# Patient Record
Sex: MALE | Race: WHITE | URBAN - METROPOLITAN AREA
[De-identification: names, ages, dates, MRNs, and addresses within clinical notes are randomized per-mention and may not be internally consistent; named-entity substitution may affect disease eponyms.]

---

## 2022-12-14 ENCOUNTER — APPOINTMENT (RX ONLY)
Dept: URBAN - METROPOLITAN AREA CLINIC 116 | Facility: CLINIC | Age: 67
Setting detail: DERMATOLOGY
End: 2022-12-14

## 2022-12-14 DIAGNOSIS — L57.0 ACTINIC KERATOSIS: ICD-10-CM

## 2022-12-14 DIAGNOSIS — L70.8 OTHER ACNE: ICD-10-CM

## 2022-12-14 DIAGNOSIS — L21.8 OTHER SEBORRHEIC DERMATITIS: ICD-10-CM

## 2022-12-14 PROCEDURE — 99203 OFFICE O/P NEW LOW 30 MIN: CPT | Mod: 25

## 2022-12-14 PROCEDURE — ? LIQUID NITROGEN

## 2022-12-14 PROCEDURE — 17000 DESTRUCT PREMALG LESION: CPT

## 2022-12-14 PROCEDURE — ? PRESCRIPTION

## 2022-12-14 RX ORDER — KETOCONAZOLE 20 MG/G
CREAM TOPICAL BID
Qty: 30 | Refills: 2 | COMMUNITY
Start: 2022-12-14

## 2022-12-14 RX ADMIN — KETOCONAZOLE: 20 CREAM TOPICAL at 00:00

## 2022-12-14 ASSESSMENT — LOCATION ZONE DERM
LOCATION ZONE: EAR
LOCATION ZONE: SCALP
LOCATION ZONE: TRUNK
LOCATION ZONE: FACE

## 2022-12-14 ASSESSMENT — LOCATION DETAILED DESCRIPTION DERM
LOCATION DETAILED: INFERIOR LUMBAR SPINE
LOCATION DETAILED: RIGHT SUPERIOR PREAURICULAR CHEEK
LOCATION DETAILED: LEFT TRAGUS
LOCATION DETAILED: MID-FRONTAL SCALP

## 2022-12-14 ASSESSMENT — LOCATION SIMPLE DESCRIPTION DERM
LOCATION SIMPLE: ANTERIOR SCALP
LOCATION SIMPLE: RIGHT CHEEK
LOCATION SIMPLE: LEFT EAR
LOCATION SIMPLE: LOWER BACK

## 2022-12-14 NOTE — PROCEDURE: LIQUID NITROGEN
Show Applicator Variable?: Yes
Detail Level: Simple
Render Post-Care Instructions In Note?: no
Number Of Freeze-Thaw Cycles: 2 freeze-thaw cycles
Consent: The patient's consent was obtained including but not limited to risks of crusting, scabbing, blistering, scarring, darker or lighter pigmentary change, recurrence, incomplete removal and infection.
Post-Care Instructions: I reviewed with the patient in detail post-care instructions. Patient is to wear sunprotection, and avoid picking at any of the treated lesions. Pt may apply Vaseline to crusted or scabbing areas.
Duration Of Freeze Thaw-Cycle (Seconds): 10

## 2023-01-18 RX ORDER — KETOCONAZOLE 20 MG/G
CREAM TOPICAL BID
Qty: 30 | Refills: 2 | Status: ERX

## 2024-06-28 ENCOUNTER — APPOINTMENT (RX ONLY)
Dept: URBAN - METROPOLITAN AREA CLINIC 114 | Facility: CLINIC | Age: 69
Setting detail: DERMATOLOGY
End: 2024-06-28

## 2024-06-28 DIAGNOSIS — L57.0 ACTINIC KERATOSIS: ICD-10-CM

## 2024-06-28 DIAGNOSIS — I83.9 ASYMPTOMATIC VARICOSE VEINS OF LOWER EXTREMITIES: ICD-10-CM

## 2024-06-28 DIAGNOSIS — L82.1 OTHER SEBORRHEIC KERATOSIS: ICD-10-CM

## 2024-06-28 DIAGNOSIS — L82.0 INFLAMED SEBORRHEIC KERATOSIS: ICD-10-CM

## 2024-06-28 DIAGNOSIS — L90.5 SCAR CONDITIONS AND FIBROSIS OF SKIN: ICD-10-CM

## 2024-06-28 DIAGNOSIS — D49.2 NEOPLASM OF UNSPECIFIED BEHAVIOR OF BONE, SOFT TISSUE, AND SKIN: ICD-10-CM

## 2024-06-28 DIAGNOSIS — D18.0 HEMANGIOMA: ICD-10-CM

## 2024-06-28 PROBLEM — D18.01 HEMANGIOMA OF SKIN AND SUBCUTANEOUS TISSUE: Status: ACTIVE | Noted: 2024-06-28

## 2024-06-28 PROBLEM — I83.93 ASYMPTOMATIC VARICOSE VEINS OF BILATERAL LOWER EXTREMITIES: Status: ACTIVE | Noted: 2024-06-28

## 2024-06-28 PROCEDURE — ? COUNSELING

## 2024-06-28 PROCEDURE — 99213 OFFICE O/P EST LOW 20 MIN: CPT | Mod: 25

## 2024-06-28 PROCEDURE — ? LIQUID NITROGEN

## 2024-06-28 PROCEDURE — 11102 TANGNTL BX SKIN SINGLE LES: CPT | Mod: 59

## 2024-06-28 PROCEDURE — ? BIOPSY BY SHAVE METHOD

## 2024-06-28 PROCEDURE — 17000 DESTRUCT PREMALG LESION: CPT | Mod: 59

## 2024-06-28 PROCEDURE — ? DEFER

## 2024-06-28 PROCEDURE — 17110 DESTRUCTION B9 LES UP TO 14: CPT

## 2024-06-28 PROCEDURE — 17003 DESTRUCT PREMALG LES 2-14: CPT | Mod: 59

## 2024-06-28 ASSESSMENT — LOCATION SIMPLE DESCRIPTION DERM
LOCATION SIMPLE: POSTERIOR NECK
LOCATION SIMPLE: LEFT SCALP
LOCATION SIMPLE: RIGHT PRETIBIAL REGION
LOCATION SIMPLE: LEFT INDEX FINGER
LOCATION SIMPLE: LEFT PRETIBIAL REGION
LOCATION SIMPLE: RIGHT UPPER BACK
LOCATION SIMPLE: LEFT TEMPLE

## 2024-06-28 ASSESSMENT — LOCATION DETAILED DESCRIPTION DERM
LOCATION DETAILED: LEFT MEDIAL FRONTAL SCALP
LOCATION DETAILED: MID POSTERIOR NECK
LOCATION DETAILED: LEFT PROXIMAL PRETIBIAL REGION
LOCATION DETAILED: RIGHT INFERIOR UPPER BACK
LOCATION DETAILED: RIGHT DISTAL PRETIBIAL REGION
LOCATION DETAILED: LEFT LATERAL TEMPLE
LOCATION DETAILED: LEFT DISTAL PRETIBIAL REGION
LOCATION DETAILED: RIGHT SUPERIOR MEDIAL UPPER BACK
LOCATION DETAILED: LEFT MID DORSAL INDEX FINGER

## 2024-06-28 ASSESSMENT — LOCATION ZONE DERM
LOCATION ZONE: LEG
LOCATION ZONE: TRUNK
LOCATION ZONE: NECK
LOCATION ZONE: FINGER
LOCATION ZONE: SCALP
LOCATION ZONE: FACE

## 2024-06-28 NOTE — PROCEDURE: DEFER
Introduction Text (Please End With A Colon): .
Instructions (Optional): Dr Hollingsworth Consult
X Size Of Lesion In Cm (Optional): 0
Detail Level: Detailed

## 2024-06-28 NOTE — PROCEDURE: BIOPSY BY SHAVE METHOD
Detail Level: Detailed
Depth Of Biopsy: dermis
Was A Bandage Applied: Yes
Size Of Lesion In Cm: 0.5
X Size Of Lesion In Cm: 0
Biopsy Type: H and E
Biopsy Method: Personna blade
Anesthesia Type: 1% lidocaine with epinephrine
Anesthesia Volume In Cc: 1
Hemostasis: Pressure
Wound Care: No ointment
Dressing: no dressing applied
Destruction After The Procedure: No
Type Of Destruction Used: Curettage
Curettage Text: The wound bed was treated with curettage after the biopsy was performed.
Cryotherapy Text: The wound bed was treated with cryotherapy after the biopsy was performed.
Electrodesiccation Text: The wound bed was treated with electrodesiccation after the biopsy was performed.
Electrodesiccation And Curettage Text: The wound bed was treated with electrodesiccation and curettage after the biopsy was performed.
Silver Nitrate Text: The wound bed was treated with silver nitrate after the biopsy was performed.
Lab: [id number]
Lab Facility: [id number]
Consent: Written consent was obtained and risks were reviewed including but not limited to scarring, infection, bleeding, scabbing, incomplete removal, nerve damage and allergy to anesthesia.
Post-Care Instructions: I reviewed with the patient in detail post-care instructions. Patient is to keep the biopsy site dry overnight, and then apply bacitracin twice daily until healed. Patient may apply hydrogen peroxide soaks to remove any crusting.
Notification Instructions: Patient will be notified of biopsy results. However, patient instructed to call the office if not contacted within 2 weeks.
Billing Type: Third-Party Bill
Information: Selecting Yes will display possible errors in your note based on the variables you have selected. This validation is only offered as a suggestion for you. PLEASE NOTE THAT THE VALIDATION TEXT WILL BE REMOVED WHEN YOU FINALIZE YOUR NOTE. IF YOU WANT TO FAX A PRELIMINARY NOTE YOU WILL NEED TO TOGGLE THIS TO 'NO' IF YOU DO NOT WANT IT IN YOUR FAXED NOTE.

## 2024-06-28 NOTE — PROCEDURE: LIQUID NITROGEN
Spray Paint Technique: No
Show Aperture Variable?: Yes
Number Of Freeze-Thaw Cycles: 3 freeze-thaw cycles
Topical Anesthesia Type: liquid nitrogen
Medical Necessity Clause: patient wears glasses and this lesion gets in the way of his glasses.
Spray Paint Text: The liquid nitrogen was applied to the skin utilizing a spray paint frosting technique.
Post-Care Instructions: I reviewed with the patient in detail post-care instructions. Patient is to wear sunprotection, and avoid picking at any of the treated lesions. Pt may apply Vaseline to crusted or scabbing areas.
Detail Level: Simple
Consent: The patient's consent was obtained including but not limited to risks of crusting, scabbing, blistering, scarring, darker or lighter pigmentary change, recurrence, incomplete removal and infection.
Duration Of Freeze Thaw-Cycle (Seconds): 5-10
Medical Necessity Information: It is in your best interest to select a reason for this procedure from the list below. All of these items fulfill various CMS LCD requirements except the new and changing color options.
Detail Level: Detailed
Duration Of Freeze Thaw-Cycle (Seconds): 3
Number Of Freeze-Thaw Cycles: 1 freeze-thaw cycle

## 2024-08-28 ENCOUNTER — APPOINTMENT (RX ONLY)
Dept: URBAN - METROPOLITAN AREA CLINIC 116 | Facility: CLINIC | Age: 69
Setting detail: DERMATOLOGY
End: 2024-08-28

## 2024-08-28 DIAGNOSIS — I87.2 VENOUS INSUFFICIENCY (CHRONIC) (PERIPHERAL): ICD-10-CM | Status: WORSENING

## 2024-08-28 PROCEDURE — ? CEAP CLASSIFICATION

## 2024-08-28 PROCEDURE — ? ADDITIONAL NOTES

## 2024-08-28 PROCEDURE — ? MEDICAL CONSULTATION: VENOUS DISEASE

## 2024-08-28 PROCEDURE — ? PHOTO-DOCUMENTATION

## 2024-08-28 PROCEDURE — ? VENOUS CLINICAL SEVERITY SCORE

## 2024-08-28 PROCEDURE — 99204 OFFICE O/P NEW MOD 45 MIN: CPT

## 2024-08-28 PROCEDURE — ? RECOMMENDATIONS

## 2024-08-28 ASSESSMENT — LOCATION DETAILED DESCRIPTION DERM
LOCATION DETAILED: LEFT PROXIMAL PRETIBIAL REGION
LOCATION DETAILED: RIGHT DISTAL PRETIBIAL REGION

## 2024-08-28 ASSESSMENT — LOCATION SIMPLE DESCRIPTION DERM
LOCATION SIMPLE: RIGHT PRETIBIAL REGION
LOCATION SIMPLE: LEFT PRETIBIAL REGION

## 2024-08-28 ASSESSMENT — LOCATION ZONE DERM: LOCATION ZONE: LEG

## 2024-08-28 NOTE — HPI: VEIN EVALUATION
Which Leg Is Worse?: Equally Affected
Is This A New Presentation, Or A Follow-Up?: Vein Evaluation
Additional History: He states his feet feel like he’s walking on blocks of ice. \\Ehsan has numbness from the knee down in BL legs. He’s had the numbness for 5-10 years. He states his pcp said he has great blood flow to his feet. He had his pulses checked by his PCP. \\nNo hx of migraines \\nNo hx of asthma \\nNo hx of a PFO. \\nAllergies: Latex \\nNot currently taking iron or doxycycline \\Ehsan does not wear compression stockings. \\nNo previous vein treatment. \\Ehsan has cramping at night 7 night a week about every hour. It does disrupt his sleep. A lot of the times he has to jump out of bed because of the cramping. He has had cramping 2-5 years. \\Ehsan’s had major neck surgery.\\Ehsan is having sinus surgery on 08/29/24

## 2024-08-28 NOTE — PROCEDURE: MEDICAL CONSULTATION: VENOUS DISEASE
Left Leg Venous Edema: 2- Afternoon edema above ankle
Include Left Vcss In Note: Yes
Include Vcss In The Note?: No
Left Leg Induration: 0- None
Left Leg Varicose Veins: 1- Few, scattered: branch varicose veins
Right Leg Pain: 2- Daily, moderate activity limitation, occasional analgesics
Pathophysiology Set 1: Pr - Reflux
Left Leg Compression Therapy: 0- None or noncompliant
Etiology Set 1: Ec - Congenital
Right Dorsalis Pedis Pulse: 2 (Easily palpable)
Clinical Classification Set 2: C0 - no visible or palpable signs of venous disease
Left Leg Circumference: medium
Anatomy Set 1: As - Superficial Veins
Right Leg Venous Hyperpigmentation: 0- None or focal low intensity (tan)
Detail Level: Zone
Right Leg: Peripheral Vascular Disease?: not assessed
Limitations: Patient reports continuing daily activities and daily functions with symptoms of cramping, numbness and is reported to be worse by days end and after activities.

## 2024-08-28 NOTE — PROCEDURE: VENOUS CLINICAL SEVERITY SCORE
Right Leg Active Ulcers: 0- None
Right Leg Compression Therapy: 0- None or noncompliant
Left Leg Varicose Veins: 1- Few, scattered: branch varicose veins
Include Left Vcss In Note: Yes
Left Leg Pigmentation: 0- None or focal low intensity (tan)
Left Leg Venous Edema: 2- Afternoon edema above ankle
Detail Level: Simple
Left Leg Pain: 2- Daily, moderate activity limitation, occasional analgesics

## 2024-08-28 NOTE — PROCEDURE: ADDITIONAL NOTES
Detail Level: Simple
Render Risk Assessment In Note?: no
Additional Notes: Fabiano would like to hold on purchasing compression until u/s review.

## 2024-08-28 NOTE — PROCEDURE: RECOMMENDATIONS
Render Risk Assessment In Note?: no
Recommendation Preamble: The following recommendations were made during the visit:\\nPatient to be scheduled for bilateral lower extremity venous ultrasound.\\nPatient to follow up with Dr. Hollingsworth after ultrasound is completed to review US results.
Detail Level: Zone
Patient Management Risk Assessment: Moderate

## 2024-10-23 ENCOUNTER — APPOINTMENT (RX ONLY)
Dept: URBAN - METROPOLITAN AREA CLINIC 114 | Facility: CLINIC | Age: 69
Setting detail: DERMATOLOGY
End: 2024-10-23

## 2024-10-23 DIAGNOSIS — L29.89 OTHER PRURITUS: ICD-10-CM

## 2024-10-23 DIAGNOSIS — B07.8 OTHER VIRAL WARTS: ICD-10-CM

## 2024-10-23 DIAGNOSIS — L90.5 SCAR CONDITIONS AND FIBROSIS OF SKIN: ICD-10-CM

## 2024-10-23 PROCEDURE — ? INTRALESIONAL KENALOG

## 2024-10-23 PROCEDURE — ? LIQUID NITROGEN

## 2024-10-23 PROCEDURE — 99212 OFFICE O/P EST SF 10 MIN: CPT | Mod: 25

## 2024-10-23 PROCEDURE — ? COUNSELING

## 2024-10-23 PROCEDURE — 11900 INJECT SKIN LESIONS </W 7: CPT | Mod: 59

## 2024-10-23 PROCEDURE — 17110 DESTRUCTION B9 LES UP TO 14: CPT

## 2024-10-23 PROCEDURE — ? PATHOLOGY DISCUSSION

## 2024-10-23 ASSESSMENT — LOCATION SIMPLE DESCRIPTION DERM
LOCATION SIMPLE: LEFT INDEX FINGER
LOCATION SIMPLE: LEFT UPPER ARM

## 2024-10-23 ASSESSMENT — LOCATION DETAILED DESCRIPTION DERM
LOCATION DETAILED: LEFT MID DORSAL INDEX FINGER
LOCATION DETAILED: LEFT ANTERIOR PROXIMAL UPPER ARM

## 2024-10-23 ASSESSMENT — LOCATION ZONE DERM
LOCATION ZONE: ARM
LOCATION ZONE: FINGER

## 2024-10-23 NOTE — PROCEDURE: INTRALESIONAL KENALOG
How Many Mls Were Removed From The 10 Mg/Ml (5ml) Vial When Preparing The Injectable Solution?: 0
Treatment Number (Optional): 1
Administered By (Optional): Irene Bosch PA-C
Total Volume (Ccs): 0.2
Kenalog Type Of Vial: Multiple Dose
Validate Note Data When Using Inventory: Yes
Concentration Of Kenalog Solution Injected (Mg/Ml): 40.0
Show Inventory Tab: Hide
Medical Necessity Clause: This procedure was medically necessary because the lesions that were treated were:
Require Ndc Code?: No
Detail Level: Simple
Lot # For Kenalog (Optional): [id number]
Expiration Date For Kenalog (Optional): JAN 2026
Consent: The risks of atrophy were reviewed with the patient.
Ndc# For Kenalog Only: 3541-5238-13
Kenalog Preparation: Kenalog

## 2024-10-23 NOTE — PROCEDURE: LIQUID NITROGEN
Spray Paint Technique: No
Show Aperture Variable?: Yes
Medical Necessity Clause: patient uses a cpap at night and this lesion gets in the way of his mask
Number Of Freeze-Thaw Cycles: 3 freeze-thaw cycles
Medical Necessity Information: It is in your best interest to select a reason for this procedure from the list below. All of these items fulfill various CMS LCD requirements except the new and changing color options.
Topical Anesthesia Type: liquid nitrogen
Post-Care Instructions: I reviewed with the patient in detail post-care instructions. Patient is to wear sunprotection, and avoid picking at any of the treated lesions. Pt may apply Vaseline to crusted or scabbing areas.
Consent: The patient's consent was obtained including but not limited to risks of crusting, scabbing, blistering, scarring, darker or lighter pigmentary change, recurrence, incomplete removal and infection.
Spray Paint Text: The liquid nitrogen was applied to the skin utilizing a spray paint frosting technique.
Detail Level: Simple
Duration Of Freeze Thaw-Cycle (Seconds): 5-10

## 2024-10-29 ENCOUNTER — APPOINTMENT (RX ONLY)
Dept: URBAN - METROPOLITAN AREA CLINIC 116 | Facility: CLINIC | Age: 69
Setting detail: DERMATOLOGY
End: 2024-10-29

## 2024-10-29 DIAGNOSIS — I87.2 VENOUS INSUFFICIENCY (CHRONIC) (PERIPHERAL): ICD-10-CM

## 2024-10-29 PROCEDURE — ? LOWER EXTREMITY DOPPLER US

## 2024-10-29 PROCEDURE — 93970 EXTREMITY STUDY: CPT

## 2024-10-29 NOTE — PROCEDURE: LOWER EXTREMITY DOPPLER US
Add Milliseconds Of Reflux To Note?: Yes
Intraluminal Thrombus: No
Right Intraluminal Thrombus- No: The right deep veins were imaged from the level of the common femoral vein to the posterior tibial veins. All deep veins demonstrated compressibility without evidence of intraluminal thrombus.
Use - 'see Attached Documentation' Verbiage?: Yes - Will Include Text Below and Will Remove Other Portions of the Note
See Attached Documentation Text: Please refer to the attached ultrasound documentation for complete details of the procedure and the venous findings.
Right Intraluminal Thrombus- Yes: The right deep veins were imaged from the level of the common femoral vein to the posterior tibial veins. There was evidence of intraluminal thrombus as noted above.
Continue Conservative Therapy Text: Continue conservative treatment (such as compression stockings, OTC analgesics, and exercise) and consider intervention if no change or worsening symptoms to varicosities.
Reflux Options: Use Range
Left Intraluminal Thrombus- No: The left deep veins were imaged from the level of the common femoral vein to the posterior tibial veins. All deep veins demonstrated compressibility without evidence of intraluminal thrombus.
Detail Level: Simple
Left Intraluminal Thrombus- Yes: The left deep veins were imaged from the level of the common femoral vein to the posterior tibial veins. There was evidence of intraluminal thrombus as noted above.

## 2024-10-29 NOTE — HPI: VEIN EVALUATION
Is This A New Presentation, Or A Follow-Up?: Follow Up Venous Evaluation
Additional History: This patient presents for an ultrasound of the bilateral lower extremity venous system.

## 2024-11-06 ENCOUNTER — APPOINTMENT (RX ONLY)
Dept: URBAN - METROPOLITAN AREA CLINIC 116 | Facility: CLINIC | Age: 69
Setting detail: DERMATOLOGY
End: 2024-11-06

## 2024-11-06 DIAGNOSIS — I87.2 VENOUS INSUFFICIENCY (CHRONIC) (PERIPHERAL): ICD-10-CM | Status: UNCHANGED

## 2024-11-06 PROCEDURE — ? RECOMMENDATIONS

## 2024-11-06 PROCEDURE — ? COUNSELING

## 2024-11-06 PROCEDURE — ? COMPRESSION STOCKING FITTING

## 2024-11-06 PROCEDURE — ? VEIN TREATMENT PLAN

## 2024-11-06 PROCEDURE — 99214 OFFICE O/P EST MOD 30 MIN: CPT

## 2024-11-06 NOTE — PROCEDURE: COUNSELING
Prescription Strength Graduated Compression Stockings Recommendations: --------------------------------\\nThe patient was counseled that prescription strength graduated compression stockings should be worn during waking hours to help eliminate venous congestion and aide in prevention of thrombosis.\\nPatient will follow up after their trial period of graduated compression stocking usage to re-evaluate their symptoms.
Detail Level: Zone
Compression Stockings Recommendations: Compression treatment acts to: Eliminate venous congestion by accelerating venous return. Decongest surrounding tissues and transport accumulated metabolic waste and interstitial fluid away\\nCompression therapy aids to prevent: Thrombosis formation and subsequent pulmonary embolism because a thrombus is prevented from being dislodged by compression pressure and can even regress or dissolve completely.

## 2024-11-06 NOTE — PROCEDURE: VEIN TREATMENT PLAN
Follow-Up After Conservative Therapy: Use Override Below
Genie Sessions - Right: 1
Continue Conservative Therapy After U/S: Yes
Ugs Sessions - Left: 2
Add Completed Treatment Information: No
Follow-Up After Conservative Therapy Override: 2 weeks.
Detail Level: Zone
Intro Statement (Will Not Render If Left Blank): Extensive discussion and education was undertaken during the office visit regarding pathophysiology, chronicity and long term prognosis of venous disease. We also discussed risk factors for venous hypertension. Our discussion included conservative management, recommendations and expected follow-up.  Patient was advised of their insurance plan requiring a conservative period of 3 months and will need to continue use of all conservative strategies discussed.  All questions were answered and no further questions were verbalized by the patient at this time.
Continue Conservative Therapy Text: The patient has signs and symptoms of chronic venous hypertension affecting daily function with ultrasound of the lower extremities demonstrating venous insufficiency. The patient will start conservative treatment and has been counseled on avoiding prolonged standing, leg elevation, analgesics, exercise weight management, and daily graduated compression stockings use (20-30mmHg or higher).\\n---

## 2024-11-06 NOTE — PROCEDURE: COMPRESSION STOCKING FITTING
Order Date: 11/6/24
Stocking Type: Stockings
Left Ankle: 23
Pantihose Type: Part A
Left Thigh: 46.5
Detail Level: Simple
Left Below Knee: 36.5
Strength: 20-30 mmHg
Toe Type: Open
Additional Instructions: Fabiano purchased thigh high compression stockings and will start his conservative treatment
Right Below Knee: 37

## 2024-11-06 NOTE — PROCEDURE: RECOMMENDATIONS
Recommendation Preamble: The following recommendations were made during the visit:
Render Risk Assessment In Note?: no
Detail Level: Zone
Recommendations (Free Text): Patient will return at the end of their conservative period for re-evaluation of symptoms.\\nRecommend preventative and conservative strategies that include compression use, walking weight management, avoiding standing for prolonged periods and analgesic use as needed.
Patient Management Risk Assessment: Moderate

## 2024-12-04 ENCOUNTER — APPOINTMENT (OUTPATIENT)
Dept: URBAN - METROPOLITAN AREA CLINIC 116 | Facility: CLINIC | Age: 69
Setting detail: DERMATOLOGY
End: 2024-12-04

## 2024-12-04 DIAGNOSIS — I87.2 VENOUS INSUFFICIENCY (CHRONIC) (PERIPHERAL): ICD-10-CM | Status: WORSENING

## 2024-12-04 PROCEDURE — ? ADDITIONAL NOTES

## 2024-12-04 PROCEDURE — ? CEAP CLASSIFICATION

## 2024-12-04 PROCEDURE — ? VEIN TREATMENT PLAN

## 2024-12-04 PROCEDURE — ? RECOMMENDATIONS

## 2024-12-04 PROCEDURE — ? PATIENT SPECIFIC COUNSELING

## 2024-12-04 PROCEDURE — 99214 OFFICE O/P EST MOD 30 MIN: CPT

## 2024-12-04 PROCEDURE — ? VENOUS CLINICAL SEVERITY SCORE

## 2024-12-04 NOTE — PROCEDURE: ADDITIONAL NOTES
Render Risk Assessment In Note?: no
Additional Notes: Fabiano has worn compression stockings on and off for the last 4 weeks. He states the help but not tremendously and when he doesn’t wear them all of his symptoms come back. He also states they are very difficult to put on. He would like to move forward with treatment due to no improvement with symptoms when he doesn’t wear compression stockings.
Detail Level: Simple

## 2024-12-04 NOTE — PROCEDURE: RECOMMENDATIONS
Patient Management Risk Assessment: Moderate
Recommendation Preamble: The following recommendations were made during the visit:\\n-Patient may schedule for procedure at preferred location.\\n-Pre/Post care instructions have been reviewed with patient and will be provided in written form on the day of procedure.
Detail Level: Zone
Render Risk Assessment In Note?: no

## 2024-12-04 NOTE — PROCEDURE: VENOUS CLINICAL SEVERITY SCORE
Right Leg Ulcer Duration: 0- None
Left Leg Venous Edema: 2- Afternoon edema above ankle
Right Leg Compression Therapy: 3- Full compliance: stockings and elevation
Left Leg Varicose Veins: 1- Few, scattered: branch varicose veins
Detail Level: Simple
Left Leg Pigmentation: 0- None or focal low intensity (tan)
Right Leg Pain: 2- Daily, moderate activity limitation, occasional analgesics
Include Left Vcss In Note: Yes

## 2025-01-29 ENCOUNTER — APPOINTMENT (OUTPATIENT)
Dept: URBAN - METROPOLITAN AREA CLINIC 116 | Facility: CLINIC | Age: 70
Setting detail: DERMATOLOGY
End: 2025-01-29

## 2025-01-29 DIAGNOSIS — I87.2 VENOUS INSUFFICIENCY (CHRONIC) (PERIPHERAL): ICD-10-CM | Status: UNCHANGED

## 2025-01-29 PROCEDURE — 36475 ENDOVENOUS RF 1ST VEIN: CPT | Mod: LT

## 2025-01-29 PROCEDURE — ? RECOMMENDATIONS

## 2025-01-29 PROCEDURE — ? EDUCATIONAL RESOURCES PROVIDED

## 2025-01-29 PROCEDURE — ? ENDOVENOUS ABLATION

## 2025-01-29 ASSESSMENT — LOCATION ZONE DERM: LOCATION ZONE: LEG

## 2025-01-29 ASSESSMENT — LOCATION SIMPLE DESCRIPTION DERM: LOCATION SIMPLE: LEFT PRETIBIAL REGION

## 2025-01-29 ASSESSMENT — LOCATION DETAILED DESCRIPTION DERM: LOCATION DETAILED: LEFT MEDIAL PROXIMAL PRETIBIAL REGION

## 2025-01-29 NOTE — PROCEDURE: ENDOVENOUS ABLATION
Tumescent Anesthesia Administered By (Optional): Dr. Jacoby Hollingsworth
Number Of Catheters Used: 1
Rf Temperature (Include Units): 120 Celcius
Consent was obtained with risks, benefits, and alternatives discussed for the above procedures.  Photographs were taken. Preoperative medications were taken as above.
Disposition: Compression dressings were placed and stockings. Wound care instructions were given, verbal and written.
Hemostasis: Pressure
Rf Cycles: ii,i,i,i,i,i,i
Add Additional Vein Treatment Details: No
Medical Necessity Clause: This therapy was medically necessary because the patient has
Number Of Incisions Per Microphlebectomy: 0
Rf Time (Include Units): Length:  49cm
Estimated Blood Loss (Cc): minimal
Tumescent Anesthesia Volume In Cc: 250
Detail Level: Detailed
Rf Access: Left Medial Proximal Pretibial Region
Medical Necessity Information: LCD Guidelines vary from payer to payer. Please check with your payer's policy to determine medical necessity.
Show Inventory: Hide

## 2025-01-29 NOTE — PROCEDURE: RECOMMENDATIONS
Recommendation Preamble: The following recommendations were made during the visit:\\n-Complete a status post ultrasound with in one week after procedure.\\n-Contact the office at [phone] during business hours with any post procedure concerns or with any questions regarding your post care.
Render Risk Assessment In Note?: no
Detail Level: Zone

## 2025-02-03 ENCOUNTER — APPOINTMENT (OUTPATIENT)
Dept: URBAN - METROPOLITAN AREA CLINIC 116 | Facility: CLINIC | Age: 70
Setting detail: DERMATOLOGY
End: 2025-02-03

## 2025-02-03 DIAGNOSIS — I87.2 VENOUS INSUFFICIENCY (CHRONIC) (PERIPHERAL): ICD-10-CM

## 2025-02-03 PROCEDURE — ?

## 2025-02-03 PROCEDURE — ? LOWER EXTREMITY DOPPLER US

## 2025-02-03 NOTE — PROCEDURE: LOWER EXTREMITY DOPPLER US
Reflux Options: Use Range
Right Intraluminal Thrombus- No: The right deep veins were imaged from the level of the common femoral vein to the posterior tibial veins. All deep veins demonstrated compressibility without evidence of intraluminal thrombus.
Continue Conservative Therapy: Yes
Use - 'see Attached Documentation' Verbiage?: Yes - Will Include Text Below and Will Remove Other Portions of the Note
Intraluminal Thrombus: No
Detail Level: Simple
See Attached Documentation Text: Please refer to the attached ultrasound documentation for complete details of the procedure and the venous findings.
Left Intraluminal Thrombus- Yes: The left deep veins were imaged from the level of the common femoral vein to the posterior tibial veins. There was evidence of intraluminal thrombus as noted above.
Right Intraluminal Thrombus- Yes: The right deep veins were imaged from the level of the common femoral vein to the posterior tibial veins. There was evidence of intraluminal thrombus as noted above.
Left Intraluminal Thrombus- No: The left deep veins were imaged from the level of the common femoral vein to the posterior tibial veins. All deep veins demonstrated compressibility without evidence of intraluminal thrombus.
Is This A Limited Us Study?: Yes - Only Bill 80286 US Code
Continue Conservative Therapy Text: Continue conservative treatment (such as compression stockings, OTC analgesics, and exercise) and consider intervention if no change or worsening symptoms to varicosities.

## 2025-02-05 ENCOUNTER — APPOINTMENT (OUTPATIENT)
Dept: URBAN - METROPOLITAN AREA CLINIC 116 | Facility: CLINIC | Age: 70
Setting detail: DERMATOLOGY
End: 2025-02-05

## 2025-02-05 DIAGNOSIS — I87.2 VENOUS INSUFFICIENCY (CHRONIC) (PERIPHERAL): ICD-10-CM | Status: UNCHANGED

## 2025-02-05 PROCEDURE — ? ENDOVENOUS ABLATION

## 2025-02-05 PROCEDURE — ? EDUCATIONAL RESOURCES PROVIDED

## 2025-02-05 PROCEDURE — 36475 ENDOVENOUS RF 1ST VEIN: CPT | Mod: RT

## 2025-02-05 PROCEDURE — ? RECOMMENDATIONS

## 2025-02-05 ASSESSMENT — LOCATION ZONE DERM: LOCATION ZONE: LEG

## 2025-02-05 ASSESSMENT — LOCATION DETAILED DESCRIPTION DERM: LOCATION DETAILED: RIGHT MEDIAL PROXIMAL PRETIBIAL REGION

## 2025-02-05 ASSESSMENT — LOCATION SIMPLE DESCRIPTION DERM: LOCATION SIMPLE: RIGHT PRETIBIAL REGION

## 2025-02-05 NOTE — PROCEDURE: ENDOVENOUS ABLATION
Add Additional Vein Treatment Details: No
Number Of Catheters Used: 1
Estimated Blood Loss (Cc): minimal
Consent was obtained with risks, benefits, and alternatives discussed for the above procedures.  Photographs were taken. Preoperative medications were taken as above.
Medical Necessity Information: LCD Guidelines vary from payer to payer. Please check with your payer's policy to determine medical necessity.
Rf Access: Right Medial Proximal Pretibial Region
Detail Level: Detailed
Number Of Incisions Per Microphlebectomy: 0
Rf Temperature (Include Units): 120 Celcius
Tumescent Anesthesia Volume In Cc: 250
Tumescent Anesthesia Administered By (Optional): Dr. Jacoby Hollingsworth
Disposition: Compression dressings were placed and stockings. Wound care instructions were given, verbal and written.
Rf Cycles: ii,i,i,i,i
Rf Time (Include Units): Length:  35cm
Hemostasis: Pressure
Medical Necessity Clause: This therapy was medically necessary because the patient has
Show Inventory: Hide

## 2025-02-05 NOTE — PROCEDURE: RECOMMENDATIONS
Detail Level: Zone
Render Risk Assessment In Note?: no
Recommendation Preamble: The following recommendations were made during the visit:\\n-Complete a status post ultrasound with in one week after procedure.\\n-Contact the office at [phone] during business hours with any post procedure concerns or with any questions regarding your post care.

## 2025-02-12 ENCOUNTER — APPOINTMENT (OUTPATIENT)
Dept: URBAN - METROPOLITAN AREA CLINIC 116 | Facility: CLINIC | Age: 70
Setting detail: DERMATOLOGY
End: 2025-02-12

## 2025-02-12 DIAGNOSIS — I87.2 VENOUS INSUFFICIENCY (CHRONIC) (PERIPHERAL): ICD-10-CM

## 2025-02-12 PROCEDURE — 93971 EXTREMITY STUDY: CPT

## 2025-02-12 PROCEDURE — ? LOWER EXTREMITY DOPPLER US

## 2025-02-12 NOTE — PROCEDURE: LOWER EXTREMITY DOPPLER US
Left Intraluminal Thrombus- Yes: The left deep veins were imaged from the level of the common femoral vein to the posterior tibial veins. There was evidence of intraluminal thrombus as noted above.
Detail Level: Simple
Recommend Sclerotherapy With Ultrasound Guidance On Right Side: No
Right Intraluminal Thrombus- Yes: The right deep veins were imaged from the level of the common femoral vein to the posterior tibial veins. There was evidence of intraluminal thrombus as noted above.
Reflux Options: Use Range
Use - 'see Attached Documentation' Verbiage?: Yes - Will Include Text Below and Will Remove Other Portions of the Note
See Attached Documentation Text: Please refer to the attached ultrasound documentation for complete details of the procedure and the venous findings.
Continue Post-Procedural Therapy: Yes
Continue Conservative Therapy Text: Continue conservative treatment (such as compression stockings, OTC analgesics, and exercise) and consider intervention if no change or worsening symptoms to varicosities.
Right Intraluminal Thrombus- No: The right deep veins were imaged from the level of the common femoral vein to the posterior tibial veins. All deep veins demonstrated compressibility without evidence of intraluminal thrombus.
Left Intraluminal Thrombus- No: The left deep veins were imaged from the level of the common femoral vein to the posterior tibial veins. All deep veins demonstrated compressibility without evidence of intraluminal thrombus.
Is This A Limited Us Study?: Yes - Only Bill 43898 US Code

## 2025-02-19 ENCOUNTER — APPOINTMENT (OUTPATIENT)
Dept: URBAN - METROPOLITAN AREA CLINIC 116 | Facility: CLINIC | Age: 70
Setting detail: DERMATOLOGY
End: 2025-02-19

## 2025-02-19 DIAGNOSIS — I87.2 VENOUS INSUFFICIENCY (CHRONIC) (PERIPHERAL): ICD-10-CM

## 2025-02-19 PROCEDURE — ? RECOMMENDATIONS

## 2025-02-19 PROCEDURE — ? VARITHENA SCLEROTHERAPY

## 2025-02-19 PROCEDURE — 36465 NJX NONCMPND SCLRSNT 1 VEIN: CPT | Mod: LT

## 2025-02-19 PROCEDURE — ? ADDITIONAL NOTES

## 2025-02-19 ASSESSMENT — LOCATION ZONE DERM: LOCATION ZONE: LEG

## 2025-02-19 ASSESSMENT — LOCATION DETAILED DESCRIPTION DERM: LOCATION DETAILED: LEFT DISTAL PRETIBIAL REGION

## 2025-02-19 ASSESSMENT — LOCATION SIMPLE DESCRIPTION DERM: LOCATION SIMPLE: LEFT PRETIBIAL REGION

## 2025-02-19 NOTE — PROCEDURE: VARITHENA SCLEROTHERAPY
Sclerosant Volume (Cc): 3
Disposition: Compression stockings and short stretch elastic bandages were placed postoperatively.
Render Post Care In Note: Yes
Detail Level: Zone
Vein Treated Override: LT D GSV, LT GSV TRIBS
Lot # [id number]: [id number]
Expiration Date A (Month Year): 12/2025
Number Of Injections (Will Not Render If 0): 0
Sclerosant (A) %: 1
Sclerosant (A): Varithena Foam
Post-Care Instructions: After Varithena or Ultrasound Guided Sclerotherapy\\n-----------------------------------------------------------------------------------------\\n-Wear graduated compressions stockings continuously over the next 48 hours (this includes sleeping in them).\\n-Walk often, even right after the procedure. A minimum of 10 minutes every hour while awake for the 48 hours.\\n-You may shower as normal after 48 hours, removal of compression stockings and bandages.\\n-No submersing yourself in hot baths, hot tubs, or sauna's for one week.\\n-No strenuous exercise or lifting more than 30lbs for the first 48 hours. (i.e., running, biking, yoga, treadmills, ellipticals machines, or workout classes)\\n-Avoid sun exposure or self-tanners for 2 weeks due to risk of discoloration/hyperpigmentation at injection sites and along treated veins.\\n-No flying or long car trips greater than 2 hours for 2 weeks post procedure.\\n\\nIf you have any questions regarding these instructions, please feel free to call the office at [phone].
Consent was obtained with risks, benefits, and alternatives discussed for the above procedures.
Show Inventory Tab: Hide

## 2025-02-19 NOTE — PROCEDURE: ADDITIONAL NOTES
Additional Notes: Treated LT GSV TRIBS with 6 cc’s of  0.5% DANITA in addition to Varithena.
Detail Level: Simple
Render Risk Assessment In Note?: no

## 2025-02-19 NOTE — PROCEDURE: RECOMMENDATIONS
Recommendation Preamble: The following recommendations were made during the visit:\\n-Post care instructions/restrictions were reviewed and provided in written form.\\n-Patient advised to contact the office with any questions, concerns or having any discomfort after treatment.
Render Risk Assessment In Note?: no
Detail Level: Zone
Patient Management Risk Assessment: Moderate

## 2025-02-26 ENCOUNTER — APPOINTMENT (OUTPATIENT)
Dept: URBAN - METROPOLITAN AREA CLINIC 116 | Facility: CLINIC | Age: 70
Setting detail: DERMATOLOGY
End: 2025-02-26

## 2025-02-26 DIAGNOSIS — I87.2 VENOUS INSUFFICIENCY (CHRONIC) (PERIPHERAL): ICD-10-CM

## 2025-02-26 PROCEDURE — ? RECOMMENDATIONS

## 2025-02-26 PROCEDURE — 36465 NJX NONCMPND SCLRSNT 1 VEIN: CPT | Mod: RT

## 2025-02-26 PROCEDURE — ? VARITHENA SCLEROTHERAPY

## 2025-02-26 PROCEDURE — ? ADDITIONAL NOTES

## 2025-02-26 ASSESSMENT — LOCATION DETAILED DESCRIPTION DERM: LOCATION DETAILED: RIGHT DISTAL PRETIBIAL REGION

## 2025-02-26 ASSESSMENT — LOCATION SIMPLE DESCRIPTION DERM: LOCATION SIMPLE: RIGHT PRETIBIAL REGION

## 2025-02-26 ASSESSMENT — LOCATION ZONE DERM: LOCATION ZONE: LEG

## 2025-02-26 NOTE — PROCEDURE: ADDITIONAL NOTES
Additional Notes: Treated small section of the RT D GSV just above ankle with 3 cc’s of 0.5% DAINTA in addition to Varithena. \\nLot: [id number]\\nExp: 03/4/25
Render Risk Assessment In Note?: no
Detail Level: Simple

## 2025-02-26 NOTE — PROCEDURE: VARITHENA SCLEROTHERAPY
Sclerosant (A) %: 1
Volume Of Varithena Foam Removed From Canister (Cc): 3
Vein Treated Override: RT D GSV, RT GSV TRIBS
Expiration Date A (Month Year): 12/2025
Disposition: Compression stockings and short stretch elastic bandages were placed postoperatively.
Consent was obtained.
Sclerosant (A): Varithena Foam
Render Post Care In Note: Yes
Post-care Instructions: After Varithena or Ultrasound Guided Sclerotherapy\\n-------------------------------------------------------------------------------------------------------------------\\n-Wear graduated compressions stockings continuously over the next 48 hours (this includes sleeping in them).\\n-Walk often, even right after the procedure. A minimum of 10 minutes every hour while awake for the 48 hours.\\n-You may shower as normal after 48 hours, removal of compression stockings and bandages.\\n-No submersing yourself in hot baths, hot tubs, or sauna's for one week.\\n-No strenuous exercise or lifting more than 30lbs for the first 48 hours. (i.e., running, biking, yoga, treadmills, ellipticals machines, or workout classes)\\n-Avoid sun exposure or self-tanners for 2 weeks due to risk of discoloration/hyperpigmentation at injection sites and along treated veins.\\n-No flying or long car trips greater than 2 hours for 2 weeks post procedure.\\n\\nIf you have any questions regarding these instructions, please feel free to call the office at [phone].
Detail Level: Zone
Number Of Injections (Will Not Render If 0): 0
Lot # [id number]: [id number]
Show Inventory Tab: Hide

## 2025-02-26 NOTE — HPI: PROCEDURE (VARITHENA)
Additional History: Fabiano states his legs feel better. He notices that cramping has improved since treatment. He still has cramping to his LT calf.

## 2025-02-26 NOTE — PROCEDURE: RECOMMENDATIONS
Detail Level: Zone
Recommendation Preamble: The following recommendations were made during the visit:\\n-Post care instructions/restrictions were reviewed and provided in written form.\\n-Patient advised to contact the office with any questions, concerns or if having discomfort after treatment.
Patient Management Risk Assessment: Moderate
Render Risk Assessment In Note?: no

## 2025-03-05 ENCOUNTER — APPOINTMENT (OUTPATIENT)
Dept: URBAN - METROPOLITAN AREA CLINIC 116 | Facility: CLINIC | Age: 70
Setting detail: DERMATOLOGY
End: 2025-03-05

## 2025-03-05 DIAGNOSIS — I87.2 VENOUS INSUFFICIENCY (CHRONIC) (PERIPHERAL): ICD-10-CM

## 2025-03-05 PROBLEM — I83.812 VARICOSE VEINS OF LEFT LOWER EXTREMITY WITH PAIN: Status: ACTIVE | Noted: 2025-03-05

## 2025-03-05 PROCEDURE — 76942 ECHO GUIDE FOR BIOPSY: CPT | Mod: LT,NC

## 2025-03-05 PROCEDURE — ? ADDITIONAL NOTES

## 2025-03-05 PROCEDURE — ? PUNCTURE ASPIRATION

## 2025-03-05 PROCEDURE — 36465 NJX NONCMPND SCLRSNT 1 VEIN: CPT | Mod: LT

## 2025-03-05 PROCEDURE — ? ULTRASONIC GUIDANCE FOR NEEDLE PLACEMENT

## 2025-03-05 PROCEDURE — 10160 PNXR ASPIR ABSC HMTMA BULLA: CPT | Mod: LT,NC

## 2025-03-05 PROCEDURE — ? RECOMMENDATIONS

## 2025-03-05 PROCEDURE — ? VARITHENA SCLEROTHERAPY

## 2025-03-05 PROCEDURE — ? MEDICAL CONSULTATION: VENOUS DISEASE

## 2025-03-05 ASSESSMENT — LOCATION ZONE DERM: LOCATION ZONE: LEG

## 2025-03-05 ASSESSMENT — LOCATION DETAILED DESCRIPTION DERM: LOCATION DETAILED: LEFT POSTERIOR ANKLE

## 2025-03-05 ASSESSMENT — LOCATION SIMPLE DESCRIPTION DERM: LOCATION SIMPLE: LEFT ANKLE

## 2025-03-05 NOTE — PROCEDURE: MEDICAL CONSULTATION: VENOUS DISEASE
Right Leg: Peripheral Vascular Disease?: No
Other Plan: Coagulum Management
Left Leg Ulcer Duration: 0- None
Right Leg Circumference: medium
Include Left Vcss In Note: Yes
Follow Up Instructions:: (1.) Apply warm compress to the area 2-3 times a day for 10 minutes at a time, followed by gentle circular massage to the area for 1-2 minutes. Continue daily until lumps and tenderness subside.\\n(2.) If needed you may take over the counter Aleve (per label directions), Ibuprofen 400-600mg every 8 hours with food, or Tylenol (per label directions) as long as there are no personal health history medical contraindications to do so.
Right Leg Venous Hyperpigmentation: 0- None or focal low intensity (tan)
Right Leg Compression Therapy: 0- None or noncompliant
Left Dorsalis Pedis Pulse: 2 (Easily palpable)
Detail Level: Zone

## 2025-03-05 NOTE — PROCEDURE: ADDITIONAL NOTES
Render Risk Assessment In Note?: no
Additional Notes: Treated a small segment of the LT D SSV with 0.5% DANITA in addition to Varithena.
Detail Level: Simple
Additional Notes: [place]ithena [place] 98921. We treated more than one vein but the additional vein is no charge as we only had authorization for 90126.

## 2025-03-05 NOTE — PROCEDURE: VARITHENA SCLEROTHERAPY
Sclerosant Volume (Cc): 3
Expiration Date A (Month Year): 01/26
Post-Care Instructions: After Varithena or Ultrasound Guided Sclerotherapy\\n-----------------------------------------------------------------------------------------\\n-Wear graduated compressions stockings continuously over the next 48 hours (this includes sleeping in them).\\n-Walk often, even right after the procedure. A minimum of 10 minutes every hour while awake for the 48 hours.\\n-You may shower as normal after 48 hours, removal of compression stockings and bandages.\\n-No submersing yourself in hot baths, hot tubs, or sauna's for one week.\\n-No strenuous exercise or lifting more than 30lbs for the first 48 hours. (i.e., running, biking, yoga, treadmills, ellipticals machines, or workout classes)\\n-Avoid sun exposure or self-tanners for 2 weeks due to risk of discoloration/hyperpigmentation at injection sites and along treated veins.\\n-No flying or long car trips greater than 2 hours for 2 weeks post procedure.\\n\\nIf you have any questions regarding these instructions, please feel free to call the office at [phone].
Vein Treated Override: LT SSV, LT D SSV and LT SSV TRIB
Number Of Injections (Will Not Render If 0): 0
Lot # [id number]: [id number]
Consent was obtained with risks, benefits, and alternatives discussed for the above procedures.
Disposition: Compression stockings and short stretch elastic bandages were placed postoperatively.
Render Post Care In Note: Yes
Sclerosant (A): Varithena Foam
Sclerosant (A) %: 1
Detail Level: Zone
Show Inventory Tab: Hide

## 2025-03-05 NOTE — PROCEDURE: RECOMMENDATIONS
Recommendation Preamble: The following recommendations were made during the visit:\\n-Patient advised to perform home coagulum management several times daily and as tolerated, by applying heat compress for 10 minutes, followed by 1-2 minutes of gentle circular massage to the affected areas.\\n-Patient should contact office to schedule an appointment @ [phone] if varicosities become increasingly painful, thrombosed or red.
Render Risk Assessment In Note?: no
Detail Level: Zone
Recommendation Preamble: The following recommendations were made during the visit:\\n-Post care instructions/restrictions were reviewed and provided in written form.\\n-Patient advised to contact the office with any questions, concerns or if having discomfort after treatment.
Patient Management Risk Assessment: Moderate

## 2025-03-05 NOTE — PROCEDURE: PUNCTURE ASPIRATION
Post-Care Instructions: I reviewed with the patient in detail post-care instructions. Patient should keep area covered for 24 hours and should call the office should any redness, pain, swelling or worsening occur.
Size Of Lesion In Cm (Optional But May Be Required For Some Insurances): 0
Dressing: pressure dressing
Drainage Amount In Cc (Will Not Render If Left At 0): 0.4
Puncture Method: 18G needle
Consent was obtained and risks were reviewed.
Drainage Type?: bloody
Curette Text (Optional): After the contents were expressed a curette was used to partially remove the cyst wall.
Render Postcare In Note?: Yes
Anesthesia Type: 1% lidocaine without epinephrine
Preparation Text: The area was prepped in the usual clean fashion.
Detail Level: Simple
Anesthesia Volume In Cc: 0.5

## 2025-03-05 NOTE — PROCEDURE: ULTRASONIC GUIDANCE FOR NEEDLE PLACEMENT
Add Additional Impression: No
Injection Technique Text: Ultrasonic Guidance For Needle Placement During Injection
Needle Biopsy Technique Text: Ultrasonic Guidance For Needle Placement During Biopsy
Procedure Performed: Aspiration
Localization Of Device Technique Text: Ultrasonic Guidance For Needle Placement During Localization of Device
Aspiration Technique Text: Ultrasonic Guidance For Needle Placement During Aspiration
Impression 1: Ultrasound guidance was used to locate areas of trapped blood and in sterile fashion areas were cleansed and prepped. Using an 18g needle a small nick was made to evacuate, aspirate and manually manipulate any remaining trapped blood. Patient tolerated procedure well with no immediate complaints. Post procedure bandages were applied and patient left in stable condition.
Indication: Intravascular Coagulum Pain
Findings: Visualization with ultrasound revealed small amounts of trapped blood in the treated and closed vein(s).
Detail Level: Simple